# Patient Record
Sex: FEMALE | Race: WHITE | NOT HISPANIC OR LATINO
[De-identification: names, ages, dates, MRNs, and addresses within clinical notes are randomized per-mention and may not be internally consistent; named-entity substitution may affect disease eponyms.]

---

## 2017-06-21 ENCOUNTER — RX ONLY (OUTPATIENT)
Age: 22
Setting detail: RX ONLY
End: 2017-06-21

## 2017-11-09 ENCOUNTER — OFFICE VISIT (OUTPATIENT)
Dept: ORTHOPEDIC SURGERY | Age: 22
End: 2017-11-09

## 2017-11-09 VITALS
SYSTOLIC BLOOD PRESSURE: 110 MMHG | DIASTOLIC BLOOD PRESSURE: 69 MMHG | RESPIRATION RATE: 16 BRPM | BODY MASS INDEX: 21.29 KG/M2 | HEART RATE: 87 BPM | WEIGHT: 109 LBS

## 2017-11-09 DIAGNOSIS — Q87.0 GOLDENHAR SYNDROME: ICD-10-CM

## 2017-11-09 DIAGNOSIS — Q76.1 CERVICAL FUSION SYNDROME: Primary | ICD-10-CM

## 2017-11-09 DIAGNOSIS — M54.6 MIDLINE THORACIC BACK PAIN, UNSPECIFIED CHRONICITY: ICD-10-CM

## 2017-11-09 DIAGNOSIS — Q76.49 CONGENITAL FUSION OF THORACIC SPINE: ICD-10-CM

## 2017-11-09 DIAGNOSIS — M79.18 MYOFASCIAL PAIN: ICD-10-CM

## 2017-11-09 RX ORDER — LORAZEPAM 2 MG/1
TABLET ORAL
Refills: 2 | COMMUNITY
Start: 2017-10-01

## 2017-11-09 RX ORDER — MELOXICAM 7.5 MG/1
7.5 TABLET ORAL DAILY
Qty: 30 TAB | Refills: 1 | Status: SHIPPED | OUTPATIENT
Start: 2017-11-09 | End: 2017-11-17

## 2017-11-09 RX ORDER — VERAPAMIL HYDROCHLORIDE 120 MG/1
TABLET, FILM COATED, EXTENDED RELEASE ORAL
Refills: 11 | COMMUNITY
Start: 2017-09-22

## 2017-11-09 NOTE — PATIENT INSTRUCTIONS
Healthy Upper Back: Exercises  Your Care Instructions  Here are some examples of exercises for your upper back. Start each exercise slowly. Ease off the exercise if you start to have pain. Your doctor or physical therapist will tell you when you can start these exercises and which ones will work best for you. How to do the exercises  Lower neck and upper back stretch    1. Stretch your arms out in front of your body. Clasp one hand on top of your other hand. 2. Gently reach out so that you feel your shoulder blades stretching away from each other. 3. Gently bend your head forward. 4. Hold for 15 to 30 seconds. 5. Repeat 2 to 4 times. Midback stretch    If you have knee pain, do not do this exercise. 1. Kneel on the floor, and sit back on your ankles. 2. Lean forward, place your hands on the floor, and stretch your arms out in front of you. Rest your head between your arms. 3. Gently push your chest toward the floor, reaching as far in front of you as possible. 4. Hold for 15 to 30 seconds. 5. Repeat 2 to 4 times. Shoulder rolls    1. Sit comfortably with your feet shoulder-width apart. You can also do this exercise while standing. 2. Roll your shoulders up, then back, and then down in a smooth, circular motion. 3. Repeat 2 to 4 times. Wall push-up    1. Stand against a wall with your feet about 12 to 24 inches back from the wall. If you feel any pain when you do this exercise, stand closer to the wall. 2. Place your hands on the wall slightly wider apart than your shoulders, and lean forward. 3. Gently lean your body toward the wall. Then push back to your starting position. Keep the motion smooth and controlled. 4. Repeat 8 to 12 times. Resisted shoulder blade squeeze    For this exercise, you will need elastic exercise material, such as surgical tubing or Thera-Band. 1. Sit or stand, holding the band in both hands in front of you.  Keep your elbows close to your sides, bent at a 90-degree angle. Your palms should face up. 2. Squeeze your shoulder blades together, and move your arms to the outside, stretching the band. Be sure to keep your elbows at your sides while you do this. 3. Relax. 4. Repeat 8 to 12 times. Resisted rows    For this exercise, you will need elastic exercise material, such as surgical tubing or Thera-Band. 1. Put the band around a solid object, such as a bedpost, at about waist level. Hold one end of the band in each hand. 2. With your elbows at your sides and bent to 90 degrees, pull the band back to move your shoulder blades toward each other. Return to the starting position. 3. Repeat 8 to 12 times. Follow-up care is a key part of your treatment and safety. Be sure to make and go to all appointments, and call your doctor if you are having problems. It's also a good idea to know your test results and keep a list of the medicines you take. Where can you learn more? Go to http://rimma-elaina.info/. Enter D662 in the search box to learn more about \"Healthy Upper Back: Exercises. \"  Current as of: March 21, 2017  Content Version: 11.4  © 8161-5949 Healthwise, Incorporated. Care instructions adapted under license by [employer] (which disclaims liability or warranty for this information). If you have questions about a medical condition or this instruction, always ask your healthcare professional. [place] [address] any warranty or liability for your use of this information.

## 2017-11-09 NOTE — PROGRESS NOTES
[facility]  [address], Formerly named [facility] [address]  Phone: [phone]  Fax: [phone]    NEW PATIENT    ASSESSMENT   Diagnoses and all orders for this visit:    1. Cervical fusion syndrome  -     [44781] C Spine 2-3V  -     REFERRAL TO PHYSICAL THERAPY  -     meloxicam (MOBIC) 7.5 mg tablet; Take 1 Tab by mouth daily. 2. Myofascial pain  -     REFERRAL TO PHYSICAL THERAPY    3. Midline thoracic back pain, unspecified chronicity  -     [80015] T Spine 2V  -     REFERRAL TO PHYSICAL THERAPY  -     meloxicam (MOBIC) 7.5 mg tablet; Take 1 Tab by mouth daily. 4. Congenital fusion of thoracic spine  -     REFERRAL TO PHYSICAL THERAPY    5. Goldenhar syndrome       IMPRESSION AND PLAN:  Silas Burden is a 23 y.o. female with history of neck and lower back pain. She complains progressive pain in the neck, upper back, and lower back that became more constant in 09/2017. She has Goldenhar syndrome, had an L5 laminectomy in 2000, and has a congential cervical fusion. Pt admits to slight relief when alternating between Advil and Tylenol but has not tried physical therapy. 1) Pt was given information on upper back exercises. 2) She was referred to cervical physical therapy with evaluation for dry needling. 3) Pt was given information on how to use a TheraCane. 4) I recommended the patient try yoga. 5) Pt was prescribed Mobic 7.5 mg 1 tab daily prn. 6) Ms. Barrie Marr has a reminder for a \"due or due soon\" health maintenance. I have asked that she contact her primary care provider, Homar Morfin MD, for follow-up on this health maintenance. 7)  demonstrated consistency with prescribing. 8) Pt will follow-up in 6 weeks. HISTORY OF PRESENT ILLNESS:  Silas Burden is a 23 y.o. female with history of neck and lower back pain. She complains progressive pain in the neck, upper back, and lower back.  She has experienced pain for about 2-3 years that became more constant in 09/2017. She denies any numbness, burning, or tingling in the arms, hands, legs, or feet. Pt denies any pain radiating down the arms at this time and denies any difficulty using buttons. 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 Pt has limited range of motion in the neck and reports that she has a congential cervical fusion. She admits that she does not sleep well. Pt admits that she occasionally wakes up with pain but generally her pain is worse at the end of the day. Pt experiences slight relief when alternating between Advil and Tylenol. She has not previously tried physical therpay. Pt is currently working for [employer] as a . She has previously been followed by Pope[facility] Starr but reports that her insurance will no longer allow her to be seen at [facility]. Of note, she has Goldenhar syndrome and has been followed by a cardiologist, nephrologist, pediatric neurosurgeon Dr. Emory Severe, and pediatric orthopedic surgeon Dr. Agapito Cardozo at [facility]. Pt at this time desires to proceed with referral to physical therapy and medication evaluation. Of note, patient is a nonsmoker. Pt has lived in [place] for about 3 years and is from [place], [place]. She is currently living with her boyfriend.     Pain Scale: 4/10     PCP: Homar Morfin MD      Past Medical History:   Diagnosis Date    Anxiety     Aortic valve insufficiency     Bicuspid aortic valve     Butterfly vertebra     T4    Cardiac abnormality     Congenital fusion of lumbar spine     L3 L4 L4-hemivertebrae    Congenital fusion of thoracic spine     T2-T5 partial    Depression     Fusion of spine of cervical region     C3/4,C4/5,C5/6, C6/7    Goldenhar syndrome     Heart murmur     Migraines     Optic nerve edema     Pulmonary artery hypoplasia     Renal cyst         Social History     Social History    Marital status: SINGLE     Spouse name: N/A    Number of children: N/A    Years of education: N/A     Occupational History    Not on file. Social History Main Topics    Smoking status: Never Smoker    Smokeless tobacco: Never Used    Alcohol use No    Drug use: Not on file    Sexual activity: Yes     Partners: Male     Birth control/ protection: Pill     Other Topics Concern    Not on file     Social History Narrative       Current Outpatient Prescriptions   Medication Sig Dispense Refill    Diclofenac Potassium (CAMBIA) 50 mg pwpk Take  by mouth.  meloxicam (MOBIC) 7.5 mg tablet Take 1 Tab by mouth daily. 30 Tab 1    norgestimate-ethinyl estradiol (SPRINTEC, 28,) 0.25-35 mg-mcg per tablet Take 1 Tab by mouth daily.  ondansetron (ZOFRAN ODT) 4 mg disintegrating tablet Take 1-2 tablets every 6-8 hours as needed for nausea and vomiting. 10 Tab 0    verapamil ER (CALAN-SR) 120 mg tablet TAKE 1 TABLET BY MOUTH EVERY DAY  11    LORazepam (ATIVAN) 2 mg tablet TAKE 1 TABLET BY MOUTH EVERY DAY AS NEEDED  2    PARoxetine (PAXIL) 10 mg tablet Take  by mouth daily.  buPROPion XL (WELLBUTRIN XL) 300 mg XL tablet Take 300 mg by mouth nightly.  ALPRAZolam (XANAX) 2 mg tablet Take 1 mg by mouth two (2) times a day.  hydrOXYzine (VISTARIL) 100 mg capsule Take 100 mg by mouth nightly. No Known Allergies    REVIEW OF SYSTEMS    Constitutional: Negative for fever, chills, or weight change. Respiratory: Negative for cough or shortness of breath. Cardiovascular: Negative for chest pain or palpitations. Gastrointestinal: Negative for acid reflux, change in bowel habits, or constipation. Genitourinary: Negative for dysuria and flank pain. Musculoskeletal: Positive for cervical and lumbar pain. Skin: Negative for rash. Neurological: Negative for headaches, dizziness, or numbness. Endo/Heme/Allergies: Negative for increased bruising. Psychiatric/Behavioral: Negative for difficulty with sleep.     PHYSICAL EXAMINATION  Visit Vitals    /69    Pulse 87    Resp 16    Wt 109 lb (49.4 kg)    BMI 21.29 kg/m2       Constitutional: Awake, alert, and in no acute distress. HEENT: Normocephalic. Atraumatic. Oropharynx is moist and clear. PERRL. EOMI. Sclerae are nonicteric  Heart: Regular rate and rhythm  Lungs: Clear to auscultation bilaterally  Abdomen: Soft and nontender. Bowel sounds are present  Neurological: 1+ symmetrical DTRs in the upper extremities. 1+ symmetrical DTRs in the lower extremities. Sensation to light touch is intact. Negative Aimee's sign bilaterally. Skin: warm, dry, and intact. Musculoskeletal: Tight across the trapezius with scattered trigger points. Decrease range of motion with side to side cervical flexion. Very limited range of motion and pain with extension. Pain with cervical flexion. Good range of motion in both shoulders. Mild tenderness to palpation in the lower lumbar region. No pain with extension, axial loading, or forward flexion. No pain with internal or external rotation of her hips. Positive straight leg raise on the right. Biceps  Triceps Deltoids Wrist Ext Wrist Flex Hand Intrin   Right +4/5 +4/5 +4/5 +4/5 +4/5 +4/5   Left +4/5 +4/5 +4/5 +4/5 +4/5 +4/5      Hip Flex  Quads Hamstrings Ankle DF EHL Ankle PF   Right +4/5 +4/5 +4/5 +4/5 +4/5 +4/5   Left +4/5 +4/5 +4/5 +4/5 +4/5 +4/5     IMAGING:    Cervical spine 2V x-rays from 11/09/2017 were personally reviewed with the patient and demonstrated:  C3-4 and C5-C7 fusion. Diffuse degenerative changes. Thoracic spine 2V x-rays from 11/09/2017 were personally reviewed with the patient and demonstrated:  Mild dextro scoliosis. Degenerative disc in the mid thoracic region. Cervical x-rays from 09/27/2016 were personally reviewed with the patient and demonstrated:  IMPRESSION:  1. Congenital fusion anomalies within the cervical and upper thoracic spine with cervical straightening. 2. Instability demonstrated at C4-5 with 4 mm range of motion.   3. Given the extensive fusion anomalies, consider evaluation with cervical/upper thoracic spine CT to better delineate osseous features. Written by Trenna Cooks, as dictated by Diana Garcia MD.  I, Dr. Diana Garcia confirm that all documentation is accurate.

## 2017-11-09 NOTE — MR AVS SNAPSHOT
Visit Information Date & Time Provider Department Dept. Phone Encounter #  
 11/9/2017  3:00 PM Miranda Joya  [facility], [address] [facility] - SPECIALTY [facility] [phone] [id number] Follow-up Instructions Return in about 6 weeks (around 12/21/2017) for PT follow up. Your Appointments 11/17/2017  8:45 AM  
New Patient with Rey Ballard MD  
Cardio Specialist at [facility] Appt Note: Self referral previously seen at [facility]. Patient will bring records. [facility] [address] [facility] [address] Upcoming Health Maintenance Date Due Hepatitis A Peds Age 1-18 (1 of 2 - Standard Series) 9/10/1998 DTaP/Tdap/Td series (1 - Tdap) 9/10/2004 HPV AGE 9Y-26Y (1 of 3 - Female 3 Dose Series) 9/10/2008 Influenza Age 5 to Adult 8/1/2017 Allergies as of 11/9/2017  Review Complete On: 11/9/2017 By: Miranda Joya MD  
 No Known Allergies Current Immunizations  Never Reviewed No immunizations on file. Not reviewed this visit You Were Diagnosed With   
  
 Codes Comments Goldenhar syndrome    -  Primary ICD-10-CM: Q87.0 ICD-9-CM: 756.0 Cervical fusion syndrome     ICD-10-CM: Q76.1 ICD-9-CM: 756.16 Myofascial pain     ICD-10-CM: M79.1 ICD-9-CM: 729.1 Midline thoracic back pain, unspecified chronicity     ICD-10-CM: M54.6 ICD-9-CM: 724.1 Vitals BP Pulse Resp Weight(growth percentile) BMI OB Status 110/69 87 16 109 lb (49.4 kg) 21.29 kg/m2 Having regular periods Smoking Status Never Smoker BMI and BSA Data Body Mass Index Body Surface Area  
 21.29 kg/m 2 1.45 m 2 Preferred Pharmacy Pharmacy Name Phone [id number], [address] [place] [address] Evan Specter [phone] Your Updated Medication List  
  
   
 This list is accurate as of: 11/9/17  5:05 PM.  Always use your most recent med list.  
  
  
  
  
 CAMBIA 50 mg Pwpk Generic drug:  Diclofenac Potassium Take  by mouth. hydrOXYzine pamoate 100 mg capsule Commonly known as:  VISTARIL Take 100 mg by mouth nightly. LORazepam 2 mg tablet Commonly known as:  ATIVAN  
TAKE 1 TABLET BY MOUTH EVERY DAY AS NEEDED  
  
 ondansetron 4 mg disintegrating tablet Commonly known as:  ZOFRAN ODT Take 1-2 tablets every 6-8 hours as needed for nausea and vomiting. PARoxetine 10 mg tablet Commonly known as:  PAXIL Take  by mouth daily. [address] [facility] (28) 0.25-35 mg-mcg Tab Generic drug:  norgestimate-ethinyl estradiol Take 1 Tab by mouth daily. verapamil  mg tablet Commonly known as:  CALAN-SR  
TAKE 1 TABLET BY MOUTH EVERY DAY WELLBUTRIN  mg XL tablet Generic drug:  buPROPion XL Take 300 mg by mouth nightly. XANAX 2 mg tablet Generic drug:  ALPRAZolam  
Take 1 mg by mouth two (2) times a day. We Performed the Following AMB POC XRAY, SPINE, CERVICAL; 2 OR 3 [23302 CPT(R)] AMB POC XRAY, SPINE; THORACIC, 2 VIEW [36006 CPT(R)] REFERRAL TO PHYSICAL THERAPY [SON87 Custom] Comments:  
 DX:Cervical and thoracic- Eval for dry needling HEP 
LOCATION:[facility] 2-3 visits/ 2-3 weeks Follow-up Instructions Return in about 6 weeks (around 12/21/2017) for PT follow up. Referral Information Referral ID Referred By Referred To  
  
 [id number] Mariella Salmeron Not Available Visits Status Start Date End Date 1 New Request 11/9/17 11/9/18 If your referral has a status of pending review or denied, additional information will be sent to support the outcome of this decision. Patient Instructions Healthy Upper Back: Exercises Your Care Instructions Here are some examples of exercises for your upper back.  Start each exercise slowly. Ease off the exercise if you start to have pain. Your doctor or physical therapist will tell you when you can start these exercises and which ones will work best for you. How to do the exercises Lower neck and upper back stretch 1. Stretch your arms out in front of your body. Clasp one hand on top of your other hand. 2. Gently reach out so that you feel your shoulder blades stretching away from each other. 3. Gently bend your head forward. 4. Hold for 15 to 30 seconds. 5. Repeat 2 to 4 times. Midback stretch If you have knee pain, do not do this exercise. 1. Kneel on the floor, and sit back on your ankles. 2. Lean forward, place your hands on the floor, and stretch your arms out in front of you. Rest your head between your arms. 3. Gently push your chest toward the floor, reaching as far in front of you as possible. 4. Hold for 15 to 30 seconds. 5. Repeat 2 to 4 times. Shoulder rolls 1. Sit comfortably with your feet shoulder-width apart. You can also do this exercise while standing. 2. Roll your shoulders up, then back, and then down in a smooth, circular motion. 3. Repeat 2 to 4 times. Wall push-up 1. Stand against a wall with your feet about 12 to 24 inches back from the wall. If you feel any pain when you do this exercise, stand closer to the wall. 2. Place your hands on the wall slightly wider apart than your shoulders, and lean forward. 3. Gently lean your body toward the wall. Then push back to your starting position. Keep the motion smooth and controlled. 4. Repeat 8 to 12 times. Resisted shoulder blade squeeze For this exercise, you will need elastic exercise material, such as surgical tubing or Thera-Band. 1. Sit or stand, holding the band in both hands in front of you. Keep your elbows close to your sides, bent at a 90-degree angle. Your palms should face up.  
2. Squeeze your shoulder blades together, and move your arms to the outside, stretching the band. Be sure to keep your elbows at your sides while you do this. 3. Relax. 4. Repeat 8 to 12 times. Resisted rows For this exercise, you will need elastic exercise material, such as surgical tubing or Thera-Band. 1. Put the band around a solid object, such as a bedpost, at about waist level. Hold one end of the band in each hand. 2. With your elbows at your sides and bent to 90 degrees, pull the band back to move your shoulder blades toward each other. Return to the starting position. 3. Repeat 8 to 12 times. Follow-up care is a key part of your treatment and safety. Be sure to make and go to all appointments, and call your doctor if you are having problems. It's also a good idea to know your test results and keep a list of the medicines you take. Where can you learn more? Go to http://rimma-elaina.info/. Enter R099 in the search box to learn more about \"Healthy Upper Back: Exercises. \" Current as of: March 21, 2017 Content Version: 11.4 © 5617-9448 [employer]. Care instructions adapted under license by [employer] (which disclaims liability or warranty for this information). If you have questions about a medical condition or this instruction, always ask your healthcare professional. [address] any warranty or liability for your use of this information. Introducing [facility]! Ulises Quezada introduces Airtime patient portal. Now you can access parts of your medical record, email your doctor's office, and request medication refills online. 1. In your internet browser, go to [email or web address] 2. Click on the First Time User? Click Here link in the Sign In box. You will see the New Member Sign Up page. 3. Enter your Airtime Access Code exactly as it appears below. You will not need to use this code after youve completed the sign-up process.  If you do not sign up before the expiration date, you must request a new code. · Dajiabao Access Code: [email or web address] Expires: 2/6/2018  1:08 PM 
 
4. Enter the last four digits of your Social Security Number (xxxx) and Date of Birth (mm/dd/yyyy) as indicated and click Submit. You will be taken to the next sign-up page. 5. Create a Dajiabao ID. This will be your Dajiabao login ID and cannot be changed, so think of one that is secure and easy to remember. 6. Create a Dajiabao password. You can change your password at any time. 7. Enter your Password Reset Question and Answer. This can be used at a later time if you forget your password. 8. Enter your e-mail address. You will receive e-mail notification when new information is available in [address] 9. Click Sign Up. You can now view and download portions of your medical record. 10. Click the Download Summary menu link to download a portable copy of your medical information. If you have questions, please visit the Frequently Asked Questions section of the Dajiabao website. Remember, Dajiabao is NOT to be used for urgent needs. For medical emergencies, dial 911. Now available from your iPhone and Android! Please provide this summary of care documentation to your next provider. Your primary care clinician is listed as Phys Other. If you have any questions after today's visit, please call [phone].

## 2017-11-13 PROBLEM — M79.18 MYOFASCIAL PAIN: Status: ACTIVE | Noted: 2017-11-13

## 2017-11-13 PROBLEM — Q87.0 GOLDENHAR SYNDROME: Status: ACTIVE | Noted: 2017-11-13

## 2017-11-13 PROBLEM — M54.6 MIDLINE THORACIC BACK PAIN: Status: ACTIVE | Noted: 2017-11-13

## 2017-11-13 PROBLEM — Q76.1 CERVICAL FUSION SYNDROME: Status: ACTIVE | Noted: 2017-11-13

## 2017-11-13 PROBLEM — Q76.49: Status: ACTIVE | Noted: 2017-11-13

## 2017-11-17 ENCOUNTER — OFFICE VISIT (OUTPATIENT)
Dept: CARDIOLOGY CLINIC | Age: 22
End: 2017-11-17

## 2017-11-17 VITALS
BODY MASS INDEX: 21.4 KG/M2 | SYSTOLIC BLOOD PRESSURE: 103 MMHG | OXYGEN SATURATION: 98 % | DIASTOLIC BLOOD PRESSURE: 75 MMHG | HEART RATE: 87 BPM | HEIGHT: 60 IN | WEIGHT: 109 LBS

## 2017-11-17 DIAGNOSIS — Q76.49 CONGENITAL FUSION OF THORACIC SPINE: ICD-10-CM

## 2017-11-17 DIAGNOSIS — E23.6 RATHKE'S CLEFT CYST (HCC): ICD-10-CM

## 2017-11-17 DIAGNOSIS — M54.6 MIDLINE THORACIC BACK PAIN, UNSPECIFIED CHRONICITY: ICD-10-CM

## 2017-11-17 DIAGNOSIS — Q20.5: ICD-10-CM

## 2017-11-17 DIAGNOSIS — I35.1 NONRHEUMATIC AORTIC VALVE INSUFFICIENCY: ICD-10-CM

## 2017-11-17 DIAGNOSIS — Q87.0 GOLDENHAR SYNDROME: Primary | ICD-10-CM

## 2017-11-17 DIAGNOSIS — Q25.79 PULMONARY ARTERY HYPOPLASIA: ICD-10-CM

## 2017-11-17 DIAGNOSIS — H50.9 STRABISMUS: ICD-10-CM

## 2017-11-17 DIAGNOSIS — Q76.1 CERVICAL FUSION SYNDROME: ICD-10-CM

## 2017-11-17 RX ORDER — ACETAMINOPHEN 500 MG
1000 TABLET ORAL
COMMUNITY

## 2017-11-17 NOTE — MR AVS SNAPSHOT
Visit Information Date & Time Provider Department Dept. Phone Encounter #  
 11/17/2017  8:45 AM Steffany Platt MD [facility] [address] Specialist at [facility] [phone] [id number] Follow-up Instructions Return in about 6 months (around 5/17/2018). Your Appointments 12/19/2017  2:15 PM  
Follow Up with Rebeca Gilliland MD  
[facility] [address]) Appt Note: cervical/thoracic fu after physical therapy at [place] [address] [place] [address] [address] [facility] [id number] [phone]  
  
   
 [address] [facility] [address] 5/16/2018  9:00 AM  
Follow Up with Steffany Platt MD  
Cardio Specialist at [address]) Appt Note: 6 months / ECHO before appt [facility] [address] Upcoming Health Maintenance Date Due Hepatitis A Peds Age 1-18 (1 of 2 - Standard Series) 9/10/1998 DTaP/Tdap/Td series (1 - Tdap) 9/10/2004 HPV AGE 9Y-26Y (1 of 3 - Female 3 Dose Series) 9/10/2008 Influenza Age 5 to Adult 8/1/2017 Allergies as of 11/17/2017  Review Complete On: 11/17/2017 By: Yan Dys No Known Allergies Current Immunizations  Never Reviewed No immunizations on file. Not reviewed this visit You Were Diagnosed With   
  
 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  
 103/75 87 5' (1.524 m) 109 lb (49.4 kg) 98% 21.29 kg/m2 OB Status Smoking Status Having regular periods Never Smoker Vitals History BMI and BSA Data Body Mass Index Body Surface Area  
 21.29 kg/m 2 1.45 m 2 Preferred Pharmacy Pharmacy Name Phone [id number], [address] [facility] Anali Mcdonald [phone] Your Updated Medication List  
  
   
This list is accurate as of: 11/17/17  9:50 AM.  Always use your most recent med list.  
  
  
  
  
 acetaminophen 500 mg tablet Commonly known as:  TYLENOL Take 1,000 mg by mouth. aspirin-acetaminophen-caffeine 250-250-65 mg per tablet Commonly known as:  EXCEDRIN ES Take 2 Tabs by mouth. CAMBIA 50 mg Pwpk Generic drug:  Diclofenac Potassium Take  by mouth. LORazepam 2 mg tablet Commonly known as:  ATIVAN  
TAKE 1 TABLET BY MOUTH EVERY DAY AS NEEDED  
  
 norethindrone-e estradiol-iron 1 mg-20 mcg (24)/75 mg (4) Tab Commonly known as:  LOESTRIN FE Take 1 Tab by mouth. ondansetron 4 mg disintegrating tablet Commonly known as:  ZOFRAN ODT Take 1-2 tablets every 6-8 hours as needed for nausea and vomiting.  
  
 verapamil  mg tablet Commonly known as:  CALAN-SR  
TAKE 1 TABLET BY MOUTH EVERY DAY Follow-up Instructions Return in about 6 months (around 5/17/2018). To-Do List   
 11/27/2017 9:30 AM  
  Appointment with Dolly Aggarwal, PT at [facility] PT [facility] [place] ([phone]) 04/17/2018 ECHO:  2D ECHO COMPLETE ADULT (TTE) W OR WO CONTR Introducing hospitals & HEALTH SERVICES! Dontrell Smiley introduces [employer] patient portal. Now you can access parts of your medical record, email your doctor's office, and request medication refills online. 1. In your internet browser, go to [email or web address] 2. Click on the First Time User? Click Here link in the Sign In box. You will see the New Member Sign Up page. 3. Enter your [employer] Access Code exactly as it appears below. You will not need to use this code after youve completed the sign-up process. If you do not sign up before the expiration date, you must request a new code. · [employer] Access Code: [email or web address] Expires: 2/6/2018  1:08 PM 
 
 4. Enter the last four digits of your Social Security Number (xxxx) and Date of Birth (mm/dd/yyyy) as indicated and click Submit. You will be taken to the next sign-up page. 5. Create a Broadlink ID. This will be your Broadlink login ID and cannot be changed, so think of one that is secure and easy to remember. 6. Create a Broadlink password. You can change your password at any time. 7. Enter your Password Reset Question and Answer. This can be used at a later time if you forget your password. 8. Enter your e-mail address. You will receive e-mail notification when new information is available in [address] 9. Click Sign Up. You can now view and download portions of your medical record. 10. Click the Download Summary menu link to download a portable copy of your medical information. If you have questions, please visit the Frequently Asked Questions section of the Broadlink website. Remember, Broadlink is NOT to be used for urgent needs. For medical emergencies, dial 911. Now available from your iPhone and Android! Please provide this summary of care documentation to your next provider. Your primary care clinician is listed as Phys Other. If you have any questions after today's visit, please call [phone].

## 2017-11-27 ENCOUNTER — HOSPITAL ENCOUNTER (OUTPATIENT)
Dept: PHYSICAL THERAPY | Age: 22
Discharge: HOME | End: 2017-11-27
Payer: COMMERCIAL

## 2017-11-27 PROCEDURE — 97162 PT EVAL MOD COMPLEX 30 MIN: CPT

## 2017-11-27 PROCEDURE — 97140 MANUAL THERAPY 1/> REGIONS: CPT

## 2017-11-27 PROCEDURE — 97110 THERAPEUTIC EXERCISES: CPT

## 2017-11-27 NOTE — PROGRESS NOTES
[facility] AT [address] [facility] [address] Bhardwaj [address]  Phone: [phone] Fax: [phone] / [address] [place] [address] PHYSICAL THERAPY SERVICES  Patient Name: Enoc Trotter : 1995   Medical   Diagnosis: Acute back pain [M54.9] Treatment Diagnosis: Acute on chronic    Onset Date: September      Referral Source: Milagros Blanc MD Start of Care [facility]): 2017   Prior Hospitalization: See medical history Provider #: [id number]   Prior Level of Function: Complex medical history, chronic C/S and T/S pain (fluctuating)   Comorbidities: goldenhar syndrome, cardiac abnormality, optic n edema, heart murmur, bicuspid aortic valve, renal cyst, pulmonary artery hypoplasia, aortic valve insufficiency, migraines, anxiety, insomnia, depression, 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 Typically resolves quickly, recent course of pain has required vicodin, meloxacam. Previous rx has included the following: current xray. She presents with pain ranging from 2-8/10, located R > L UT, upper T/S L > R, posterior neck. Pt notes HA 2-3x/week, lasting several hours. Pain is made worse with lifting, exertion, pain after certain movements, better with meds (minimally). Denies red flags, Denies Bowel and bladder sxs. Pt has no radicular sxs in the LEs. C/S AROM: flexion full with pain , extension 28 with pain , LSB 35, RSB 18 (normal), LRotation 45,  RRotation 45.   Forward head 28 degrees, increased C/S lordosis, T/S kyphosis, and L upper C/S tilt. Palpation reveals TTP UT, LS, scalenes, SCM, L > R rhomoboids, T/S parapsinals. Reduced joint mobility throughout C/S and upper TS due to fusions. MMT GHJ grossly 4-/5 with pain on the R GHJ testing. Deferred LT, MT testing due to pain. (-) Special tests include: compression .  (+) Special tests include: distraction decreaes pain. Functional limitations include: sitting 30-45 minutes, job requirements (lifting, cleaning, bending); sleeping tolerance, going out in the evenings/driving safely at night due to pain.   Pt will benefit from PT interventions to address the aforementioned deficits and allow pt to return to [facility].   ========================================================================  Eval Complexity: History: HIGH Complexity :3+ comorbidities / personal factors will impact the outcome/ POC Exam:HIGH Complexity : 4+ Standardized tests and measures addressing body structure, function, activity limitation and / or participation in recreation  Presentation: MEDIUM Complexity : Evolving with changing characteristics  Clinical Decision Making:MEDIUM Complexity : FOTO score of 26-74Overall Complexity:MEDIUM  Problem List: pain affecting function, decrease ROM, decrease strength, impaired gait/ balance, decrease ADL/ functional abilitiies, decrease activity tolerance, decrease flexibility/ joint mobility and decrease transfer abilities   Treatment Plan may include any combination of the following: Therapeutic exercise, Therapeutic activities, Neuromuscular re-education, Physical agent/modality, Gait/balance training, Manual therapy, Patient education, Self Care training, Functional mobility training, Home safety training and Stair training  Patient / Family readiness to learn indicated by: asking questions, trying to perform skills and interest  Persons(s) to be included in education: patient (P)  Barriers to Learning/Limitations: None  Measures taken:    Patient Goal (s): \"get rid of pain \"   Patient self reported health status: good  Rehabilitation Potential: good   Short Term Goals: To be accomplished in  2  treatments:  1. Pt will be independent and compliant with HEP to decrease pain, increase ROM and return pt to PLOF. 2. Pt will demo proper seated and standing posture > or = 50% of the time without VC to improve work, ADL tolerance.  Long Term Goals: To be accomplished in  8-12  treatments:  1. Pt will increase score on the FOTO to > or = 73/100 to demo an increase in functional activity tolerance. 2. Pt will be able to self-manage ave pain to < or = 2/10 to improve ease of posture, mobility and self-care  3. Pt will demo decrease in forward head posture to < or = 12 degrees without VC to improve chronic recurring pain   4. Pt will have an increase in GHJ and scapular strength to > or = 4/5 all planes to improve work tolerance   Frequency / Duration:   Patient to be seen  2  times per week for 8-12  treatments:  Patient / Caregiver education and instruction: self care, activity modification and exercises  G-Codes (GP): aure  Therapist Signature: Yaya Jimenez DPT Date: 54/69/4470   Certification Period: Na  Time: 7:49 AM   ========================================================================  I certify that the above Physical Therapy Services are being furnished while the patient is under my care. I agree with the treatment plan and certify that this therapy is necessary. Physician Signature:        Date:       Time:   Please sign and return to [facility] or you may fax the signed copy to [phone]. Thank you.

## 2017-11-27 NOTE — PROGRESS NOTES
PT DAILY TREATMENT NOTE     Patient Name: Abdoul Caldera  Date:2017  : 1995  [x]  Patient  Verified  Payor: BLUE CROSS / Plan: [facility] PPO / Product Type: PPO /    In time:9:48  Out time:10:40  Total Treatment Time (min): 52  Total Timed Codes (min): 20  1:1 Treatment Time (min): 52   Visit #: 1 of     Treatment Area: Acute back pain [M54.9]    SUBJECTIVE  Pain Level (0-10 scale): 4  Any medication changes, allergies to medications, adverse drug reactions, diagnosis change, or new procedure performed?: [x] No    [] Yes (see summary sheet for update)  Subjective functional status/changes:   [] No changes reported  SEE IE for Subjective Information    OBJECTIVE        10 min Therapeutic Exercise:  [] See flow sheet :   Rationale: increase ROM and increase strength to improve the patients ability to improve reaching, movement, working tolerance     10 min Manual Therapy:  STm to C/S, T/S. SOR. Grade II to III PA glides to upper T/S    Rationale: decrease pain, increase ROM, increase tissue extensibility and decrease trigger points to improve ease of mobility             x min Patient Education: [x] Review HEP    [] Progressed/Changed HEP based on:   [] positioning   [] body mechanics   [] transfers   [] heat/ice application        Other Objective/Functional Measures:   Chief Complaint: chronic recurring back pain   Present functional limitations: movement, lifting, work,   Mechanism of injury: acute on chronic     Symptoms  Aggravated by: see functional limitations    Eased by: meds, minimally  Past History/Treatments: cupping     Diagnostic Tests: Xray, MRI (sevearl years ago)     Headaches: Do you have headaches? 2-3x/week recently   Dizziness/tinnitus/vision changes: none     OBJECTIVE  Posture:  Forward head 28   C-Kyphosis:  [] increased   [] decreased   C-Lordosis:   [x] increased   [] decreased  T-Kyphosis:  [x] increased   [] decreased  T-Lordosis:   [] increased   [] decreased     Active Movements: guarded :  ROM % CS % LS Comments:pain, area   Forward flexion Full p! Extension 28 p! SB right 18  (normal)   SB left  35     Rotation right 45  normal   Rotation left 45  Normal      Thoracic Spine: 50% to the (L) with anterior shoulder and UT pain. Palpation: significant TTP R > L UT, LS, posterior C/S, subocciput; L > R T/S paraspinals, L rhomoboid, R Rhomboid     Special Tests:  Cervical:        Spurling's:  [] R    [] L    [] +    [x] -       Distraction:  [] R    [] L    [x] +    [] -       Compression: [] R    [] L    [] +    [x] -  Muscle Flexibility: reduced scalenes, UT, LS, pec major R  Global Muscular Weakness:    Grossly 4-/5 (B) UE   OBJECTIVE LS  Posture:  Lateral Shift: [] R    [] L     [] +  [] -  R rotation, R hump with forward flexion   Kyphosis: [] Increased [] Decreased   []  WNL  Lordosis:  [] Increased [] Decreased   [] WNL      AROM GHJ: WFL (chronicially limited due to T/S fusions); IR limited to T9 with pain (atypical), ER at 0: 75       Pain Level (0-10 scale) post treatment: 3    ASSESSMENT/Changes in Function: see IE    Patient will continue to benefit from skilled PT services to modify and progress therapeutic interventions, address functional mobility deficits, address ROM deficits, address strength deficits, analyze and address soft tissue restrictions, analyze and cue movement patterns, analyze and modify body mechanics/ergonomics, assess and modify postural abnormalities and instruct in home and community integration to attain remaining goals.      [x]  See Plan of Care  []  See progress note/recertification  []  See Discharge Summary         Progress towards goals / Updated goals:  SEE IE FOR GOALS     PLAN  []  Upgrade activities as tolerated     []  Continue plan of care  []  Update interventions per flow sheet       []  Discharge due to:_  [x]  Other:Initiate POC as stated in the IE      Justification for Eval Code Complexity:  Patient History : complex medical history, recurring pain   Examination limited movement, limited pain, joint limitations, trigger points   Clinical Presentation: evolving and changing   Clinical Decision Making : DIGNA 59/100     Piper Mariano DPT 11/27/2017  7:49 AM

## 2017-11-30 ENCOUNTER — HOSPITAL ENCOUNTER (OUTPATIENT)
Dept: PHYSICAL THERAPY | Age: 22
Discharge: HOME | End: 2017-11-30
Payer: COMMERCIAL

## 2017-11-30 PROCEDURE — 97140 MANUAL THERAPY 1/> REGIONS: CPT

## 2017-11-30 PROCEDURE — 97110 THERAPEUTIC EXERCISES: CPT

## 2017-11-30 NOTE — PROGRESS NOTES
PT DAILY TREATMENT NOTE     Patient Name: Lanny Everett  Date:2017  : 1995  [x]  Patient  Verified  Payor: BLUE CROSS / Plan: [facility] PPO / Product Type: PPO /    In time:4:31  Out time:5:24  Total Treatment Time (min): 53  Total Timed Codes (min): 53  1:1 Treatment Time (min): 53   Visit #: 2 of     Treatment Area: Acute back pain [M54.9]    SUBJECTIVE  Pain Level (0-10 scale): 5  Any medication changes, allergies to medications, adverse drug reactions, diagnosis change, or new procedure performed?: [x] No    [] Yes (see summary sheet for update)  Subjective functional status/changes:   [] No changes reported  Some of the exercises are making me sore but I have no pain. OBJECTIVE    28 min Therapeutic Exercise:  [x] See flow sheet :   Rationale: increase ROM, increase strength and improve coordination to improve the patients ability to improve movement, sports, wokring. 25 min Manual Therapy: Technique:      [x] S/DTM []IASTM []PROM [] Passive Stretching   [x]manual TPR  [x] TDN (see objective; actual needle insertion time not billed)  [x]Jt manipulation:Gr I [] II []  III [] IV[x] V[]  Treatment/Area:  T/S, UT, rhomboids, R pec major     Rationale:      decrease pain, increase ROM, increase tissue extensibility and decrease trigger points to improve patient's ability to sit, perform work activities, reaching, dressing     Dry Needling Procedure Note    Dry Needle Session Number:  1    Procedure: An intramuscular manual therapy (dry needling) and a neuro-muscular re-education treatment was done to deactivate myofascial trigger points, with a 15/30 gauge solid filament needle, under aseptic technique.     Indication(s): [x] Muscle spasms [x] Myalgia/Myositis  [] Muscle cramps      [x] Muscle imbalances [] TMD (TMJ) [x] Myofascial pain & dysfunction     [] Other: __    Chart reviewed for the following:  Amna CARVAJAL, PT, have reviewed the medical history, summary list and precautions/contraindications for [place]. TIME OUT performed immediately prior to start of procedure:  4:40 (enter time the timeout was completed)  José Miguel CARVAJAL, PT, have performed the following reviews on [place] prior to the start of the session:      [x] Patient was identified by name and date of birth    [x] Agreement on all muscles being treated was verified   [x] Purpose of dry needling, side effects, possible complications, risks and benefits were explained to the patient   [x] Procedure site(s) verified  [x] Patient was positioned for comfort and draped for privacy  [x] Informed Consent was signed (initial visit) and verified verbally (subsequent visits)  [x] Patient was instructed on the need to report the use of blood thinners and/or immunosuppressant medications  [x] How to respond to possible adverse effects of treatment  [x] Self treatment of post needling soreness: ice, heat (moist heat, heat wraps) and stretching  [x] Opportunity was given to ask any questions, all questions were answered            Treatment:  The following muscles were treated today:    Right: Posterior C/S, rhomboid minor ,rhomboid major, T/S paraspinals. Left:  UT, posterior C/S      Patients response to todays treatment:    [x]  LTRs  [x]  Muscle Relaxation  [x]  Pain Relief  []  Decreased Tinnitus  []  Decreased HAs [x]  Post needling soreness [x]  Increased ROM   []  Other:        x min Patient Education: [x] Review HEP    [] Progressed/Changed HEP based on:   [] positioning   [] body mechanics   [] transfers   [] heat/ice application        Other Objective/Functional Measures:   Pt notes apprehension about needling, although she read the consent form at home and again at treatment and knew there would be needles involved. She notes she is ok to go ahead with treatment, and signed form as well as answered multiple questions by PT to determine if she was ok to needle.    Asked pt throughout session if she was ok to continue and pt notes \"Yes\". Pain Level (0-10 scale) post treatment: 5-6    ASSESSMENT/Changes in Function: first session since IE. Goals progressing appropriately. Good  tolerance to first session of IMT today. Pt notes she is also having cupping done by a friend and this goes well for her (consistent treatments received in the past) . Pt demo's fair rponse to initiation of TE today. [place] in the TS, approx T7. Patient will continue to benefit from skilled PT services to modify and progress therapeutic interventions, address functional mobility deficits, address ROM deficits, address strength deficits, analyze and address soft tissue restrictions, analyze and cue movement patterns, analyze and modify body mechanics/ergonomics, assess and modify postural abnormalities and instruct in home and community integration to attain remaining goals. []  See Plan of Care  []  See progress note/recertification  []  See Discharge Summary         Progress towards goals / Updated goals: · Short Term Goals: To be accomplished in  2  treatments:  1. Pt will be independent and compliant with HEP to decrease pain, increase ROM and return pt to PLOF. Pt notes soreness but no pain with HEP, compliant (11/30/17)  2. Pt will demo proper seated and standing posture > or = 50% of the time without VC to improve work, ADL tolerance. Improved but con't to require VC (11/30/17)  · Long Term Goals: To be accomplished in  8-12  treatments:  1. Pt will increase score on the FOTO to > or = 73/100 to demo an increase in functional activity tolerance. 2. Pt will be able to self-manage ave pain to < or = 2/10 to improve ease of posture, mobility and self-care  3. Pt will demo decrease in forward head posture to < or = 12 degrees without VC to improve chronic recurring pain   4.  Pt will have an increase in GHJ and scapular strength to > or = 4/5 all planes to improve work tolerance Progressing with increased strength today (11/30/17)    PLAN  [x]  Upgrade activities as tolerated     []  Continue plan of care  []  Update interventions per flow sheet        []  Discharge due to:_  [x]  Other:_  Assess response to initial IMT session     Ania Abdalla DPT 11/30/2017  1:18 PM

## 2017-12-01 PROBLEM — I35.1 NONRHEUMATIC AORTIC VALVE INSUFFICIENCY: Status: ACTIVE | Noted: 2017-12-01

## 2017-12-01 PROBLEM — Q24.5 CORONARY ARTERY ANOMALY: Status: ACTIVE | Noted: 2017-12-01

## 2017-12-01 PROBLEM — H50.9 STRABISMUS: Status: ACTIVE | Noted: 2017-12-01

## 2017-12-01 PROBLEM — E23.6 RATHKE'S CLEFT CYST (HCC): Status: ACTIVE | Noted: 2017-12-01

## 2017-12-01 PROBLEM — Q25.79 PULMONARY ARTERY HYPOPLASIA: Status: ACTIVE | Noted: 2017-12-01

## 2017-12-01 PROBLEM — Q20.5: Status: ACTIVE | Noted: 2017-12-01

## 2017-12-01 NOTE — PROGRESS NOTES
Subjective:      Enoc Trotter is in the office today for cardiac evaluation. She is a 51-year-old woman with a history of Goldenhar syndrome. She has been followed by Cardiology at [facility] on a yearly basis. She has a MRI done on an every other year basis. The last cardiac MRI that she had was in 2016. The patient has a constellation of congenital findings, including pulmonary artery hypoplasia, levocardia, atrial situs solitus, left pulmonary vein is a common vein, and unicommissural aortic valve with partial fusion of the right and noncoronary cusps. Valve area was 2.7 cm2 and she had mild aortic insufficiency. 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 The patient has no cardiac limitations. She is presently working as a  for [employer] without limitation. The patient states that she can walk up two flights of steps without stopping for dyspnea. She can also walk two blocks easily without limiting dyspnea. She has had no PND. She sleeps on one pillow. She has had no leg swelling. Overall, she has no major complaints in the office today. 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  norethindrone-e estradiol-iron (LOESTRIN FE) 1 mg-20 mcg (24)/75 mg (4) tab Take 1 Tab by mouth.       verapamil ER (CALAN-SR) 120 mg tablet TAKE 1 TABLET BY MOUTH EVERY DAY  11    LORazepam (ATIVAN) 2 mg tablet TAKE 1 TABLET BY MOUTH EVERY DAY AS NEEDED  2    Diclofenac Potassium (CAMBIA) 50 mg pwpk Take  by mouth.  ondansetron (ZOFRAN ODT) 4 mg disintegrating tablet Take 1-2 tablets every 6-8 hours as needed for nausea and vomiting. 10 Tab 0    aspirin-acetaminophen-caffeine (EXCEDRIN ES) 250-250-65 mg per tablet Take 2 Tabs by mouth.        No Known Allergies  Past Medical History:   Diagnosis Date    Anxiety     Aortic valve insufficiency     Bicuspid aortic valve     Butterfly vertebra     T4    Cardiac abnormality     Congenital fusion of lumbar spine     L3 L4 L4-hemivertebrae    Congenital fusion of thoracic spine     T2-T5 partial    Depression     Fusion of spine of cervical region     C3/4,C4/5,C5/6, C6/7    Goldenhar syndrome     Heart murmur     Migraines     Optic nerve edema     Pulmonary artery hypoplasia     Renal cyst      Past Surgical History:   Procedure Laterality Date    HX HEENT  1998/2010    strabismus    HX HERNIA REPAIR  1997    HX LUMBAR LAMINECTOMY  2000    L5    HX ORTHOPAEDIC      HX OTHER SURGICAL  2009    Rathke's cyst     Family History   Problem Relation Age of Onset    Arthritis-osteo Mother     Cancer Paternal Aunt      History   Smoking Status    Never Smoker   Smokeless Tobacco    Never Used          Review of Systems, additional:  Constitutional: negative  Eyes: negative  Respiratory: negative  Cardiovascular: negative  Gastrointestinal: negative  Musculoskeletal:negative  Neurological: negative  Behvioral/Psych: negative  Endocrine: negative  ENT: negative    Objective:     Visit Vitals    /75    Pulse 87    Ht 5' (1.524 m)    Wt 109 lb (49.4 kg)    SpO2 98%    BMI 21.29 kg/m2     General:  alert, cooperative, no distress   Chest Wall: inspection normal - no chest wall deformities or tenderness, respiratory effort normal   Lung: clear to auscultation bilaterally   Heart:  normal rate and regular rhythm, S1 and S2 normal, no gallops noted   Abdomen: soft, non-tender. Bowel sounds normal. No masses,  no organomegaly   Extremities: extremities normal, atraumatic, no cyanosis or edema Skin: no rashes   Neuro: alert, oriented, normal speech, no focal findings or movement disorder noted         Assessment/Plan:       ICD-10-CM ICD-9-CM    1. Goldenhar syndrome, will order Cardiac MRI 2018. RT 6 mos with Echocardiogram. Q87.0 756.0 2D ECHO COMPLETE ADULT (TTE) W OR WO CONTR   2. Congenital fusion of thoracic spine, T2-T5 Q76.49 756.15    3. Atrial situs solitus, levocardia      4. Nonrheumatic aortic valve insufficiency, mild. Unicommisural aortic valve with partial fusion R and noncoronary cusps. Valve area 2.7 cm2. I35.1 424.1    5. Pulmonary artery hypoplasia Q25.79 747.31    6. Rathke's cleft cyst (HCC) E23.6 253.8    7. Cervical fusion syndrome, C3/4, C4/5, C5/6, C6/7 Q76.1 756.16    8. Strabismus H50.9 378.9    9.  Midline thoracic back pain, unspecified chronicity M54.6 724.1

## 2017-12-05 ENCOUNTER — HOSPITAL ENCOUNTER (OUTPATIENT)
Dept: PHYSICAL THERAPY | Age: 22
Discharge: HOME | End: 2017-12-05
Payer: COMMERCIAL

## 2017-12-05 PROCEDURE — 97035 APP MDLTY 1+ULTRASOUND EA 15: CPT

## 2017-12-05 PROCEDURE — 97110 THERAPEUTIC EXERCISES: CPT

## 2017-12-05 NOTE — PROGRESS NOTES
PT DAILY TREATMENT NOTE     Patient Name: Yas Estes  Date:2017  : 1995  [x]  Patient  Verified  Payor: BLUE CROSS / Plan: 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 \"     OBJECTIVE    Modality rationale: Decrease m tightness and promote tissue elasticity    Min Type Additional Details    [] Estim: []Att   []Unatt        []TENS instruct                  []IFC  []Premod   []NMES                     []Other:  []w/US   []w/ice   []w/heat  Position:  Location:    []  Traction: [] Cervical       []Lumbar                       [] Prone          []Supine                       []Intermittent   []Continuous Lbs:  [] before manual  [] after manual   10 (8 min rx) [x]  Ultrasound: [x]Continuous   [] Pulsed                           [x]1MHz   []3MHz Location: R UT   W/cm2: 1.0    []  Iontophoresis with dexamethasone         Location: [] Take home patch   [] In clinic    []  Ice     []  heat  []  Ice massage Position: supine  Location: R knee    []  Vasopneumatic Device: Pressure:       [] lo [] med [] hi   Temperature: [] lo [] med [] hi       31 min Therapeutic Exercise:  [x] See flow sheet :   Rationale: increase ROM, increase strength and improve coordination to improve the patients ability to improve movement, sports, wokring.        3 min Manual Therapy: Technique:  Attempted SOR and STM         Rationale:      decrease pain, increase ROM, increase tissue extensibility and decrease trigger points to improve patient's ability to sit, perform work activities, reaching, dressing     Dry Needling Procedure Note    Dry Needle Session Number:  PD      x min Patient Education: [x] Review HEP           Other Objective/Functional Measures:    No change to program to see determine if therex only is as exacerbating as therex and IMT     Pain Level (0-10 scale) post treatment: 5-6    ASSESSMENT/Changes in Function: Patient reports first IMT session caused severe m pain and soreness to where she could not move her neck and therefore declined IMT treatment again. Patient reports if she didn't have to drive, she would be interested in the IMT  Patient demo significant fatigue with postural therex such as TB ER and rows. Patient did not tolerate manual techniques today , therefore intiaited US for deep heating. Patient will continue to benefit from skilled PT services to modify and progress therapeutic interventions, address functional mobility deficits, address ROM deficits, address strength deficits, analyze and address soft tissue restrictions, analyze and cue movement patterns, analyze and modify body mechanics/ergonomics, assess and modify postural abnormalities and instruct in home and community integration to attain remaining goals. [x]  See Plan of Care  []  See progress note/recertification  []  See Discharge Summary         Progress towards goals / Updated goals: All goals reviewed and progressing appropriately as of 12/5/2017  · Short Term Goals: To be accomplished in  2  treatments:  1. Pt will be independent and compliant with HEP to decrease pain, increase ROM and return pt to PLOF. Pt notes soreness but no pain with HEP, compliant (11/30/17)  2. Pt will demo proper seated and standing posture > or = 50% of the time without VC to improve work, ADL tolerance. Improved but con't to require VC (11/30/17)  · Long Term Goals: To be accomplished in  8-12  treatments:  1.  Pt will increase score on the FOTO to > or = 73/100 to demo an increase in functional activity tolerance. 2. Pt will be able to self-manage ave pain to < or = 2/10 to improve ease of posture, mobility and self-care  3. Pt will demo decrease in forward head posture to < or = 12 degrees without VC to improve chronic recurring pain   4.  Pt will have an increase in GHJ and scapular strength to > or = 4/5 all planes to improve work tolerance  Progressing with increased strength today (11/30/17)    PLAN  [x]  Upgrade activities as tolerated     []  Continue plan of care  []  Update interventions per flow sheet        []  Discharge due to:_  [x]  Other:_assess response to no IMT and [id number] DPT  12/5/2017

## 2017-12-07 ENCOUNTER — HOSPITAL ENCOUNTER (OUTPATIENT)
Dept: PHYSICAL THERAPY | Age: 22
Discharge: HOME | End: 2017-12-07
Payer: COMMERCIAL

## 2017-12-07 PROCEDURE — 97035 APP MDLTY 1+ULTRASOUND EA 15: CPT

## 2017-12-07 PROCEDURE — 97110 THERAPEUTIC EXERCISES: CPT

## 2017-12-07 NOTE — PROGRESS NOTES
PT DAILY TREATMENT NOTE     Patient Name: Quan Cashing  Date:2017  : 1995  [x]  Patient  Verified  Payor: BLUE CROSS / Plan: [facility] PPO / Product Type: PPO /    In time: 4:03 Out time:4:52  Total Treatment Time (min): 49  Total Timed Codes (min): 49  1:1 Treatment Time (min): 49   Visit #: 4 of     Treatment Area: Acute back pain [M54.9]    SUBJECTIVE  Pain Level (0-10 scale): 3  Any medication changes, allergies to medications, adverse drug reactions, diagnosis change, or new procedure performed?: [x] No    [] Yes (see summary sheet for update)  Subjective functional status/changes:   [] No changes reported  Doing better today. The US did great.       OBJECTIVE  Modality rationale: decrease pain and increase tissue extensibility to improve the patients ability to improve mobility, comfort with sitting, working    Min Type Additional Details    [] Estim: []Att   []Unatt        []TENS instruct                  []IFC  []Premod   []NMES                     []Other:  []w/US   []w/ice   []w/heat  Position:  Location:    []  Traction: [] Cervical       []Lumbar                       [] Prone          []Supine                       []Intermittent   []Continuous Lbs:  [] before manual  [] after manual   10 (8 billed) [x]  Ultrasound: [x]Continuous   [] Pulsed                           [x]1MHz   []3MHz Location: R UT and LS in prone   W/cm2: 1.0    []  Iontophoresis with dexamethasone         Location: [] Take home patch   [] In clinic    []  Ice     []  heat  []  Ice massage Position:  Location:    []  Vasopneumatic Device Pressure:       [] lo [] med [] hi   Temperature: [] lo [] med [] hi   [x] Skin assessment post-treatment:  [x]intact []redness- no adverse reaction       []redness  adverse reaction:       39 min Therapeutic Exercise:  [x] See flow sheet : initiate prone letters, increased towel OA nods; sitting RRIS to improve postural form and stability , increased tband ER reps and stance away from the wall to increase resistance. Rationale: increase ROM, increase strength and improve coordination to improve the patients ability to improve mobility for ADLs, working, self-care, driving     NI min Manual Therapy:  Held MT today as pt responding well to US and TE program    Rationale: decrease pain, increase ROM and increase tissue extensibility to 3         x min Patient Education: [x] Review HEP    [] Progressed/Changed HEP based on:   [] positioning   [] body mechanics   [] transfers   [] heat/ice application        Other Objective/Functional Measures:   Fwd head posture: pt can achieve neutral with RRIS  Resting position: 5   Improved ROM noted with Tband ER   Theracane 2 minutes    Pt notes she might change her work schedule occasionally to do another IMT session as needed. PT notes that this could be indicated fatmata as her overall mm trigger points decrease     Pain Level (0-10 scale) post treatment: 3    ASSESSMENT/Changes in Function: significant mobility limitations with wall angels, but progress across the 10 reps with improved T/S extension and height of (B) GHJ into elevation. No postural cues required for form with tband extensions. Improved mobility noted with T/S open book rotation. Patient will continue to benefit from skilled PT services to modify and progress therapeutic interventions, address functional mobility deficits, address ROM deficits, address strength deficits, analyze and address soft tissue restrictions, analyze and cue movement patterns, analyze and modify body mechanics/ergonomics, assess and modify postural abnormalities and instruct in home and community integration to attain remaining goals. []  See Plan of Care  []  See progress note/recertification  []  See Discharge Summary         Progress towards goals / Updated goals:   · Short Term Goals: To be accomplished in  2  treatments:  1.  Pt will be independent and compliant with HEP to decrease pain, increase ROM and return pt to PLOF.   Pt notes soreness but no pain with HEP, compliant (11/30/17)  2. Pt will demo proper seated and standing posture > or = 50% of the time without VC to improve work, ADL tolerance. Improved but con't to require VC (11/30/17)  · Long Term Goals: To be accomplished in  8-12  treatments:  1. Pt will increase score on the FOTO to > or = 73/100 to demo an increase in functional activity tolerance. 2. Pt will be able to self-manage ave pain to < or = 2/10 to improve ease of posture, mobility and self-care Decreasing pain levels to 3/10 ave (12/7/17)  3. Pt will demo decrease in forward head posture to < or = 12 degrees without VC to improve chronic recurring pain  Resting position is 5 degrees, 0 degrees with RRIS (12/7/17)  4. Pt will have an increase in GHJ and scapular strength to > or = 4/5 all planes to improve work tolerance  Progressing with prone series today (12/7/17)  PLAN  [x]  Upgrade activities as tolerated     [x]  Continue plan of care  []  Update interventions per flow sheet       []  Discharge due to:_  [x]  Other:_ resume IMT as pt requests if she changes her work schedule.  Otherwise,      Mateo Dietz, PT 12/7/2017  1:32 PM

## 2017-12-12 ENCOUNTER — HOSPITAL ENCOUNTER (OUTPATIENT)
Dept: PHYSICAL THERAPY | Age: 22
End: 2017-12-12
Payer: COMMERCIAL

## 2017-12-14 ENCOUNTER — HOSPITAL ENCOUNTER (OUTPATIENT)
Dept: PHYSICAL THERAPY | Age: 22
Discharge: HOME | End: 2017-12-14
Payer: COMMERCIAL

## 2017-12-14 PROCEDURE — 97035 APP MDLTY 1+ULTRASOUND EA 15: CPT

## 2017-12-14 PROCEDURE — 97140 MANUAL THERAPY 1/> REGIONS: CPT

## 2017-12-14 PROCEDURE — 97110 THERAPEUTIC EXERCISES: CPT

## 2017-12-14 NOTE — PROGRESS NOTES
PT DAILY TREATMENT NOTE     Patient Name: Michelle Landa  Date:2017  : 1995  [x]  Patient  Verified  Payor: BLUE CROSS / Plan: [facility] [id number] PPO / Product Type: PPO /    In time:5:01  Out time: 6:00  Total Treatment Time (min): 59  Total Timed Codes (min): 59  1:1 Treatment Time (min): 59   Visit #: 5 of     Treatment Area: Acute back pain [M54.9]    SUBJECTIVE  Pain Level (0-10 scale): 3  Any medication changes, allergies to medications, adverse drug reactions, diagnosis change, or new procedure performed?: [x] No    [] Yes (see summary sheet for update)  Subjective functional status/changes:   [] No changes reported  Things are going well, although missing Tuesday might have made things worse in the R UT/LS (pt points).    Improvements: postural awareness,   Deficits: 30 minutes sitting, still avoiding job requirements at this point to avoid pain     OBJECTIVE  Modality rationale: decrease pain and increase tissue extensibility to improve the patients ability to improve sitting, driving, working    [employer] Type Additional Details    [] Estim: []Att   []Unatt        []TENS instruct                  []IFC  []Premod   []NMES                     []Other:  []w/US   []w/ice   []w/heat  Position:  Location:    []  Traction: [] Cervical       []Lumbar                       [] Prone          []Supine                       []Intermittent   []Continuous Lbs:  [] before manual  [] after manual   8 [x]  Ultrasound: [x]Continuous   [] Pulsed                           [x]1MHz   []3MHz Location: R UT and LS in prone   W/cm2: 1.0    []  Iontophoresis with dexamethasone         Location: [] Take home patch   [] In clinic    []  Ice     []  heat  []  Ice massage Position:  Location:    []  Vasopneumatic Device Pressure:       [] lo [] med [] hi   Temperature: [] lo [] med [] hi   [x] Skin assessment post-treatment:  [x]intact []redness- no adverse reaction       []redness  adverse reaction:       36 min Therapeutic Exercise:  [x] See flow sheet :   Rationale: increase ROM, increase strength and improve coordination to improve the patients ability to improve work responsibilities, sitting     15 min Manual Therapy: TrP release to the R UT, LS nad posterior C/S in prone and in supine. SOR. Manual traction. TrP release with simulated contractions of the UT, 3x15   Rationale: decrease pain, increase ROM, increase tissue extensibility and decrease trigger points to improve sitting, working, driving           x min Patient Education: [x] Review HEP    [x] Progressed/Changed HEP based on:  Update HEP for ROM, strength , Red Tband given for (B) ER GHJ   [] positioning   [] body mechanics   [] transfers   [] heat/ice application        Other Objective/Functional Measures:   C/S AROM; flexion WNL, Ext 42, LSB 35, RSB 45, L rotation 70, R rotation 50  Fwd head : 2 degrees  GHJ MMT 4/5 flexion, abduction, IR, 4-/5 ER      Pain Level (0-10 scale) post treatment: 2    ASSESSMENT/Changes in Function: pt progressing well with AROM of the C/S, improved posture in sitting and standing, decreased winging with scapula, improved strength. Con't difficulty abolishing the TrP in the angle of the UT which causes pain and dysfunction. Resonded well today to TrP release with simulated contractions of the UT by the pt. Patient will continue to benefit from skilled PT services to modify and progress therapeutic interventions, address functional mobility deficits, address ROM deficits, address strength deficits, analyze and address soft tissue restrictions, analyze and cue movement patterns, analyze and modify body mechanics/ergonomics and assess and modify postural abnormalities to attain remaining goals. []  See Plan of Care  []  See progress note/recertification  []  See Discharge Summary         Progress towards goals / Updated goals:   · Short Term Goals: To be accomplished in  2  treatments:  1.  Pt will be independent and compliant with HEP to decrease pain, increase ROM and return pt to PLOF.   Pt notes soreness but no pain with HEP, compliant (11/30/17)  · 2. Pt will demo proper seated and standing posture > or = 50% of the time without VC to improve work, ADL tolerance.  Goal met. Improved without VC, >50% of the time in the clinic (12/14/17)  ·   · [address] be accomplished in  8-12  treatments:  1. Pt will increase score on the FOTO to > or = 73/100 to demo an increase in functional activity tolerance. 2. Pt will be able to self-manage ave pain to < or = 2/10 to improve ease of posture, mobility and self-care Decreasing pain levels to 3/10 ave (12/7/17)  3. Pt will demo decrease in forward head posture to < or = 12 degrees without VC to improve chronic recurring pain  Goal met, Resting position is 2 degrees, 0 degrees with RRIS (12/14/17)  4. Pt will have an increase in GHJ and scapular strength to > or = 4/5 all planes to improve work tolerance  Progressing with prone series today (12/7/17) and with 4/5 flexion and abduction (12/14/17)    PLAN  [x]  Upgrade activities as tolerated     []  Continue plan of care  []  Update interventions per flow sheet       []  Discharge due to:_  [x]  Other:_  co'nt TrP release to angle of UT; assess increase in HEP. Con't US and MT to tolerance.  Resume IMT to 1 TrP when pt agreeable as she does not have to work the next day     Edith Daugherty, PT 12/14/2017  1:59 PM

## 2017-12-19 ENCOUNTER — OFFICE VISIT (OUTPATIENT)
Dept: ORTHOPEDIC SURGERY | Age: 22
End: 2017-12-19

## 2017-12-19 ENCOUNTER — HOSPITAL ENCOUNTER (OUTPATIENT)
Dept: PHYSICAL THERAPY | Age: 22
Discharge: HOME | End: 2017-12-19
Payer: COMMERCIAL

## 2017-12-19 DIAGNOSIS — Q76.1 CERVICAL FUSION SYNDROME: ICD-10-CM

## 2017-12-19 DIAGNOSIS — Q87.0 GOLDENHAR SYNDROME: ICD-10-CM

## 2017-12-19 DIAGNOSIS — M79.18 MYOFASCIAL PAIN: Primary | ICD-10-CM

## 2017-12-19 DIAGNOSIS — Q76.49 CONGENITAL FUSION OF THORACIC SPINE: ICD-10-CM

## 2017-12-19 DIAGNOSIS — M62.838 MUSCLE SPASM: ICD-10-CM

## 2017-12-19 PROCEDURE — 97035 APP MDLTY 1+ULTRASOUND EA 15: CPT

## 2017-12-19 PROCEDURE — 97140 MANUAL THERAPY 1/> REGIONS: CPT

## 2017-12-19 PROCEDURE — 97110 THERAPEUTIC EXERCISES: CPT

## 2017-12-19 RX ORDER — DICLOFENAC SODIUM 75 MG/1
75 TABLET, DELAYED RELEASE ORAL 2 TIMES DAILY
Qty: 60 TAB | Refills: 1 | Status: SHIPPED | OUTPATIENT
Start: 2017-12-19

## 2017-12-19 RX ORDER — METAXALONE 800 MG/1
TABLET ORAL
Qty: 60 TAB | Refills: 1 | Status: SHIPPED | OUTPATIENT
Start: 2017-12-19

## 2017-12-19 NOTE — PROGRESS NOTES
PT DAILY TREATMENT NOTE     Patient Name: Michelle Landa  Date:2017  : 1995  [x]  Patient  Verified  Payor: BLUE CROSS / Plan: [facility] PPO / Product Type: PPO /    In time:500  Out time: 548  Total Treatment Time (min): 48  Visit #: 6 of     Treatment Area: Acute back pain [M54.9]    SUBJECTIVE  Pain Level (0-10 scale): 5  Any medication changes, allergies to medications, adverse drug reactions, diagnosis change, or new procedure performed?: [x] No    [] Yes (see summary sheet for update)  Subjective functional status/changes:   [] No changes reported  \"My right shoulder has been more painful in the past day. \"    OBJECTIVE  Modality rationale: decrease pain and increase tissue extensibility to improve the patients ability to improve sitting, driving, working    [employer] Type Additional Details    [] Estim: []Att   []Unatt        []TENS instruct                  []IFC  []Premod   []NMES                     []Other:  []w/US   []w/ice   []w/heat  Position:  Location:    []  Traction: [] Cervical       []Lumbar                       [] Prone          []Supine                       []Intermittent   []Continuous Lbs:  [] before manual  [] after manual   8 [x]  Ultrasound: [x]Continuous   [] Pulsed                           [x]1MHz   []3MHz Location: R UT and LS in prone   W/cm2: 1.0    []  Iontophoresis with dexamethasone         Location: [] Take home patch   [] In clinic    []  Ice     []  heat  []  Ice massage Position:  Location:    []  Vasopneumatic Device Pressure:       [] lo [] med [] hi   Temperature: [] lo [] med [] hi   [x] Skin assessment post-treatment:  [x]intact []redness- no adverse reaction       []redness  adverse reaction:       25 min Therapeutic Exercise:  [x] See flow sheet : 1 unit charged    Rationale: increase ROM, increase strength and improve coordination to improve the patients ability to improve work responsibilities, sitting     15 min Manual Therapy: Supine CS PA mobs and UPA for R rotation , gentle distraction, prone STM/ DTM to B UT and LS (R greater than L )   Rationale: decrease pain, increase ROM, increase tissue extensibility and decrease trigger points to improve sitting, working, driving           x min Patient Education: [x] Review HEP   - progressed last session        Other Objective/Functional Measures:    CS rotation - dec 50% B with co scalene pain R      Pain Level (0-10 scale) post treatment: 5    ASSESSMENT/Changes in Function: Increased pain levels of unknown origin today. Patient reports she has to work tomorrow so declined IMT. Patient was able to tolerated deeper pressure with MT today compared to previous treatments. Patient reports US aids in loosening tissue, however pain and rotation approx the same after treatment. Patient returned to referring MD this morning who changed  Her pain medication and encouraged her to continue with PT. Patient will continue to benefit from skilled PT services to modify and progress therapeutic interventions, address functional mobility deficits, address ROM deficits, address strength deficits, analyze and address soft tissue restrictions, analyze and cue movement patterns, analyze and modify body mechanics/ergonomics and assess and modify postural abnormalities to attain remaining goals. [x]  See Plan of Care  []  See progress note/recertification  []  See Discharge Summary         Progress towards goals / Updated goals: All goals reviewed and progressing appropriately as of 12/19/2017   · Short Term Goals: To be accomplished in  2  treatments:  1.  Pt will be independent and compliant with HEP to decrease pain, increase ROM and return pt to PLOF.   Pt notes soreness but no pain with HEP, compliant (11/30/17)  · 2. Pt will demo proper seated and standing posture > or = 50% of the time without VC to improve work, ADL tolerance.  Goal met.  Improved without VC, >50% of the time in the clinic (12/14/17)      · Long Term Goals: To be accomplished in  8-12  treatments:  1. Pt will increase score on the FOTO to > or = 73/100 to demo an increase in functional activity tolerance. Goal limited as indicated by consistent pain levels 12/19/17  2. Pt will be able to self-manage ave pain to < or = 2/10 to improve ease of posture, mobility and self-care Decreasing pain levels to 3/10 ave (12/7/17)  3. Pt will demo decrease in forward head posture to < or = 12 degrees without VC to improve chronic recurring pain  Goal met, Resting position is 2 degrees, 0 degrees with RRIS (12/14/17)  4.  Pt will have an increase in GHJ and scapular strength to > or = 4/5 all planes to improve work tolerance  Progressing with prone series today (12/7/17) and with 4/5 flexion and abduction (12/14/17)    PLAN  [x]  Upgrade activities as tolerated     []  Continue plan of care  []  Update interventions per flow sheet       []  Discharge due to:_  [x]  Other:_  Patient scheduled through the 28th but encouraged to cont by referring MD - patient will schedule 4 more weeks into Jan - PN due next week     Address scalene tightness nV     Melania Parikh, PT 12/19/2017

## 2017-12-19 NOTE — PATIENT INSTRUCTIONS
Neck Arthritis: Exercises  Your Care Instructions  Here are some examples of typical rehabilitation exercises for your condition. Start each exercise slowly. Ease off the exercise if you start to have pain. Your doctor or physical therapist will tell you when you can start these exercises and which ones will work best for you. How to do the exercises  Neck stretches to the side    1. This stretch works best if you keep your shoulder down as you lean away from it. To help you remember to do this, start by relaxing your shoulders and lightly holding on to your thighs or your chair. 2. Tilt your head toward your shoulder and hold for 15 to 30 seconds. Let the weight of your head stretch your muscles. 3. Repeat 2 to 4 times toward each shoulder. Chin tuck    1. Lie on the floor with a rolled-up towel under your neck. Your head should be touching the floor. 2. Slowly bring your chin toward your chest.  3. Hold for a count of 6, and then relax for up to 10 seconds. 4. Repeat 8 to 12 times. Active cervical rotation    1. Sit in a firm chair, or stand up straight. 2. Keeping your chin level, turn your head to the right, and hold for 15 to 30 seconds. 3. Turn your head to the left and hold for 15 to 30 seconds. 4. Repeat 2 to 4 times to each side. Shoulder blade squeeze    1. While standing, squeeze your shoulder blades together. 2. Do not raise your shoulders up as you are squeezing. 3. Hold for 6 seconds. 4. Repeat 8 to 12 times. Shoulder rolls    1. Sit comfortably with your feet shoulder-width apart. You can also do this exercise standing up. 2. Roll your shoulders up, then back, and then down in a smooth, circular motion. 3. Repeat 2 to 4 times. Follow-up care is a key part of your treatment and safety. Be sure to make and go to all appointments, and call your doctor if you are having problems. It's also a good idea to know your test results and keep a list of the medicines you take.   Where can you learn more? Go to http://rimma-elaina.info/. Enter E022 in the search box to learn more about \"Neck Arthritis: Exercises. \"  Current as of: March 21, 2017  Content Version: 11.4  © 5725-6211 [employer]. Care instructions adapted under license by [employer] (which disclaims liability or warranty for this information). If you have questions about a medical condition or this instruction, always ask your healthcare professional. [address] any warranty or liability for your use of this information. Healthy Upper Back: Exercises  Your Care Instructions  Here are some examples of exercises for your upper back. Start each exercise slowly. Ease off the exercise if you start to have pain. Your doctor or physical therapist will tell you when you can start these exercises and which ones will work best for you. How to do the exercises  Lower neck and upper back stretch    4. Stretch your arms out in front of your body. Clasp one hand on top of your other hand. 5. Gently reach out so that you feel your shoulder blades stretching away from each other. 6. Gently bend your head forward. 7. Hold for 15 to 30 seconds. 8. Repeat 2 to 4 times. Midback stretch    If you have knee pain, do not do this exercise. 5. Kneel on the floor, and sit back on your ankles. 6. Lean forward, place your hands on the floor, and stretch your arms out in front of you. Rest your head between your arms. 7. Gently push your chest toward the floor, reaching as far in front of you as possible. 8. Hold for 15 to 30 seconds. 9. Repeat 2 to 4 times. Shoulder rolls    5. Sit comfortably with your feet shoulder-width apart. You can also do this exercise while standing. 6. Roll your shoulders up, then back, and then down in a smooth, circular motion. 7. Repeat 2 to 4 times. Wall push-up    5. Stand against a wall with your feet about 12 to 24 inches back from the wall. If you feel any pain when you do this exercise, stand closer to the wall. 6. Place your hands on the wall slightly wider apart than your shoulders, and lean forward. 7. Gently lean your body toward the wall. Then push back to your starting position. Keep the motion smooth and controlled. 8. Repeat 8 to 12 times. Resisted shoulder blade squeeze    For this exercise, you will need elastic exercise material, such as surgical tubing or Thera-Band. 4. Sit or stand, holding the band in both hands in front of you. Keep your elbows close to your sides, bent at a 90-degree angle. Your palms should face up. 5. Squeeze your shoulder blades together, and move your arms to the outside, stretching the band. Be sure to keep your elbows at your sides while you do this. 6. Relax. 7. Repeat 8 to 12 times. Resisted rows    For this exercise, you will need elastic exercise material, such as surgical tubing or Thera-Band. 1. Put the band around a solid object, such as a bedpost, at about waist level. Hold one end of the band in each hand. 2. With your elbows at your sides and bent to 90 degrees, pull the band back to move your shoulder blades toward each other. Return to the starting position. 3. Repeat 8 to 12 times. Follow-up care is a key part of your treatment and safety. Be sure to make and go to all appointments, and call your doctor if you are having problems. It's also a good idea to know your test results and keep a list of the medicines you take. Where can you learn more? Go to http://rimma-elaina.info/. Enter R913 in the search box to learn more about \"Healthy Upper Back: Exercises. \"  Current as of: March 21, 2017  Content Version: 11.4  © 9205-4761 [employer]. Care instructions adapted under license by [employer] (which disclaims liability or warranty for this information).  If you have questions about a medical condition or this instruction, always ask your healthcare professional. [address] any warranty or liability for your use of this information.
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
## 2017-12-20 VITALS
BODY MASS INDEX: 20.5 KG/M2 | WEIGHT: 108.6 LBS | TEMPERATURE: 98.4 F | HEART RATE: 74 BPM | SYSTOLIC BLOOD PRESSURE: 118 MMHG | HEIGHT: 61 IN | DIASTOLIC BLOOD PRESSURE: 76 MMHG | RESPIRATION RATE: 12 BRPM

## 2017-12-21 ENCOUNTER — APPOINTMENT (OUTPATIENT)
Dept: PHYSICAL THERAPY | Age: 22
End: 2017-12-21
Payer: COMMERCIAL

## 2018-01-21 ENCOUNTER — RX ONLY (OUTPATIENT)
Age: 23
Setting detail: RX ONLY
End: 2018-01-21

## 2018-01-24 NOTE — PROGRESS NOTES
[address] [facility] [address] [facility] AT [address]. [place], [place], [place]  Phone: [phone] Fax 21 [phone] SUMMARY  Patient Name: Letha Orosco : 1995   Treatment/Medical Diagnosis: Acute back pain [M54.9]   Referral Source: Carlos Garcia MD     Date of Initial Visit: 17 Attended Visits: 6 Missed Visits: 4     SUMMARY OF TREATMENT  Patient was being treated for CS and TS pain with hx of congential musculoskeletal disorder. Treatment included progressive therex for ROM, flexibility and strength, attempted at IMT/ dry needling and manual.   CURRENT STATUS  Patient made fair to limited progress with PT. Patient reports no significant change in pain levels. Attemtped IMT/ dry needling, however patient was unable to tolerate treatment and post needling soreness that interfered with work duties. Patient was scheduled to return to PT for reassessment, however failed to do so at last attended session on 17. Unable to formally reassess all goals, but assessment up to last day as below. Patient has all the tools and knowledge needed to continue progress via HEP at this time. Assessment as follows: · Short Term Goals: To be accomplished in  2  treatments:  1.  Pt will be independent and compliant with HEP to decrease pain, increase ROM and return pt to PLOF.   Pt notes soreness but no pain with HEP, compliant (17)  · 2. Pt will demo proper seated and standing posture > or = 50% of the time without VC to improve work, ADL tolerance.  Goal met. Improved without VC, >50% of the time in the clinic (17)        · Long Term Goals: To be accomplished in  8-12  treatments:  1. Pt will increase score on the FOTO to > or = 73/100 to demo an increase in functional activity tolerance. Goal limited as indicated by consistent pain levels 17  2.  Pt will be able to self-manage ave pain to < or = 2/10 to improve ease of posture, mobility and self-care Decreasing pain levels to 3/10 ave (12/7/17)  3. Pt will demo decrease in forward head posture to < or = 12 degrees without VC to improve chronic recurring pain  Goal met, Resting position is 2 degrees, 0 degrees with RRIS (12/14/17)  4. Pt will have an increase in GHJ and scapular strength to > or = 4/5 all planes to improve work tolerance  Progressing with prone series today (12/7/17) and with 4/5 flexion and abduction (12/14/17)  RECOMMENDATIONS  Discontinue therapy due to lack of appreciable progress towards goals. Gcode: NA  If you have any questions/comments please contact us directly at [phone]. Thank you for allowing us to assist in the care of your patient.   Therapist Signature: Rocio Manzanares PT Date: 1/24/18   Reporting Period: NA Time: 701AM

## 2021-09-21 ENCOUNTER — RX ONLY (OUTPATIENT)
Age: 26
Setting detail: RX ONLY
End: 2021-09-21

## 2022-02-02 ENCOUNTER — RX ONLY (OUTPATIENT)
Age: 27
Setting detail: RX ONLY
End: 2022-02-02

## 2022-03-18 ENCOUNTER — APPOINTMENT (RX ONLY)
Dept: URBAN - METROPOLITAN AREA CLINIC 34 | Facility: CLINIC | Age: 27
Setting detail: DERMATOLOGY
End: 2022-03-18

## 2022-03-18 DIAGNOSIS — L663 OTHER SPECIFIED DISEASES OF HAIR AND HAIR FOLLICLES: ICD-10-CM | Status: INADEQUATELY CONTROLLED

## 2022-03-18 DIAGNOSIS — L73.9 FOLLICULAR DISORDER, UNSPECIFIED: ICD-10-CM | Status: INADEQUATELY CONTROLLED

## 2022-03-18 DIAGNOSIS — L738 OTHER SPECIFIED DISEASES OF HAIR AND HAIR FOLLICLES: ICD-10-CM | Status: INADEQUATELY CONTROLLED

## 2022-03-18 PROBLEM — L02.221 FURUNCLE OF ABDOMINAL WALL: Status: ACTIVE | Noted: 2022-03-18

## 2022-03-18 PROBLEM — L02.425 FURUNCLE OF RIGHT LOWER LIMB: Status: ACTIVE | Noted: 2022-03-18

## 2022-03-18 PROBLEM — L02.426 FURUNCLE OF LEFT LOWER LIMB: Status: ACTIVE | Noted: 2022-03-18

## 2022-03-18 PROCEDURE — ? PRESCRIPTION MEDICATION MANAGEMENT

## 2022-03-18 PROCEDURE — ? ADDITIONAL NOTES

## 2022-03-18 PROCEDURE — 99204 OFFICE O/P NEW MOD 45 MIN: CPT

## 2022-03-18 PROCEDURE — ? COUNSELING

## 2022-03-18 ASSESSMENT — LOCATION ZONE DERM
LOCATION ZONE: LEG
LOCATION ZONE: TRUNK

## 2022-03-18 ASSESSMENT — LOCATION SIMPLE DESCRIPTION DERM
LOCATION SIMPLE: LEFT THIGH
LOCATION SIMPLE: RIGHT THIGH
LOCATION SIMPLE: GROIN

## 2022-03-18 ASSESSMENT — LOCATION DETAILED DESCRIPTION DERM
LOCATION DETAILED: LEFT ANTERIOR PROXIMAL THIGH
LOCATION DETAILED: RIGHT SUPRAPUBIC SKIN
LOCATION DETAILED: RIGHT ANTERIOR PROXIMAL THIGH
LOCATION DETAILED: LEFT SUPRAPUBIC SKIN

## 2022-03-18 NOTE — HPI: BUMPS
How Severe Are Your Bumps?: moderate
Have Your Bumps Been Treated?: been treated
Is This A New Presentation, Or A Follow-Up?: Bumps
Additional History: Tried doxycycline, ten skin, Clindamycin gel, benzoyl peroxide, changing soap. Patient has tried waxing. \\n\\nClindamycin worked best.

## 2022-03-18 NOTE — PROCEDURE: PRESCRIPTION MEDICATION MANAGEMENT
Samples Given: CLn wash
Detail Level: Zone
Render In Strict Bullet Format?: No
Plan: Consider LHR patient given handout on pricing.

## 2022-03-18 NOTE — PROCEDURE: ADDITIONAL NOTES
Additional Notes: Pt has tried BPO wash, topical clindamycin, and a variety of different topical OTCs without improvement.\\nRecommend LHR
Render Risk Assessment In Note?: no
Detail Level: Simple